# Patient Record
Sex: FEMALE | HISPANIC OR LATINO | Employment: FULL TIME | URBAN - METROPOLITAN AREA
[De-identification: names, ages, dates, MRNs, and addresses within clinical notes are randomized per-mention and may not be internally consistent; named-entity substitution may affect disease eponyms.]

---

## 2017-11-16 ENCOUNTER — APPOINTMENT (OUTPATIENT)
Dept: RADIOLOGY | Facility: IMAGING CENTER | Age: 27
End: 2017-11-16
Attending: NURSE PRACTITIONER
Payer: COMMERCIAL

## 2017-11-16 ENCOUNTER — OFFICE VISIT (OUTPATIENT)
Dept: URGENT CARE | Facility: PHYSICIAN GROUP | Age: 27
End: 2017-11-16
Payer: COMMERCIAL

## 2017-11-16 VITALS
OXYGEN SATURATION: 96 % | SYSTOLIC BLOOD PRESSURE: 126 MMHG | WEIGHT: 228 LBS | RESPIRATION RATE: 16 BRPM | HEART RATE: 110 BPM | TEMPERATURE: 99.8 F | HEIGHT: 66 IN | DIASTOLIC BLOOD PRESSURE: 80 MMHG | BODY MASS INDEX: 36.64 KG/M2

## 2017-11-16 DIAGNOSIS — M25.532 LEFT WRIST PAIN: ICD-10-CM

## 2017-11-16 DIAGNOSIS — J10.1 INFLUENZA A: Primary | ICD-10-CM

## 2017-11-16 LAB
FLUAV+FLUBV AG SPEC QL IA: POSITIVE
INT CON NEG: NEGATIVE
INT CON NEG: NEGATIVE
INT CON POS: POSITIVE
INT CON POS: POSITIVE
S PYO AG THROAT QL: NEGATIVE

## 2017-11-16 PROCEDURE — 87804 INFLUENZA ASSAY W/OPTIC: CPT | Performed by: NURSE PRACTITIONER

## 2017-11-16 PROCEDURE — 87880 STREP A ASSAY W/OPTIC: CPT | Performed by: NURSE PRACTITIONER

## 2017-11-16 PROCEDURE — 73100 X-RAY EXAM OF WRIST: CPT | Mod: TC,LT | Performed by: NURSE PRACTITIONER

## 2017-11-16 PROCEDURE — 99203 OFFICE O/P NEW LOW 30 MIN: CPT | Performed by: NURSE PRACTITIONER

## 2017-11-16 RX ORDER — OSELTAMIVIR PHOSPHATE 75 MG/1
75 CAPSULE ORAL 2 TIMES DAILY
Qty: 10 CAP | Refills: 0 | Status: SHIPPED | OUTPATIENT
Start: 2017-11-16 | End: 2021-01-26

## 2017-11-16 ASSESSMENT — ENCOUNTER SYMPTOMS
COUGH: 1
ABDOMINAL PAIN: 0
DIARRHEA: 0
SPUTUM PRODUCTION: 1
FEVER: 0
NAUSEA: 0
HEADACHES: 1
NECK PAIN: 0
SORE THROAT: 1
CHILLS: 1
VOMITING: 0
SENSORY CHANGE: 0
RHINORRHEA: 1
FOCAL WEAKNESS: 0
MYALGIAS: 1
TINGLING: 0
SHORTNESS OF BREATH: 0
BACK PAIN: 0
WEAKNESS: 1

## 2017-11-16 ASSESSMENT — COPD QUESTIONNAIRES: COPD: 0

## 2017-11-16 NOTE — PROGRESS NOTES
"Subjective:      Nicole Castro is a 27 y.o. female who presents with Cough (fever, sore throat, bilateral red spots on hands itchingX 3 days L. wrist pain X 1 wek)            Medications, Allergies and Prior Medical Hx reviewed and updated in [facility].with patient/family today     Bib with family who has similar sx. Pt states sx have been going thru the family.     Pt is also c/o left wrist pain unknown HIRO.       Cough   This is a new problem. The current episode started in the past 7 days (2 days). The problem has been gradually worsening. The cough is productive of sputum. Associated symptoms include chills, headaches, myalgias, nasal congestion, rhinorrhea and a sore throat. Pertinent negatives include no ear congestion, ear pain, fever or shortness of breath. Nothing aggravates the symptoms. She has tried OTC cough suppressant for the symptoms. The treatment provided mild relief. There is no history of asthma, COPD or emphysema.       Review of Systems   Constitutional: Positive for chills and malaise/fatigue. Negative for fever.   HENT: Positive for congestion, rhinorrhea and sore throat. Negative for ear discharge and ear pain.    Respiratory: Positive for cough and sputum production. Negative for shortness of breath.    Gastrointestinal: Negative for abdominal pain, diarrhea, nausea and vomiting.   Musculoskeletal: Positive for joint pain and myalgias. Negative for back pain and neck pain.   Neurological: Positive for weakness and headaches. Negative for tingling, sensory change and focal weakness.          Objective:     /80   Pulse (!) 110   Temp 37.7 °C (99.8 °F)   Resp 16   Ht 1.676 m (5' 6\")   Wt 103.4 kg (228 lb)   SpO2 96%   BMI 36.80 kg/m²      Physical Exam   Constitutional: She appears well-developed and well-nourished. No distress.   HENT:   Head: Normocephalic and atraumatic.   Right Ear: Tympanic membrane and ear canal normal.   Left Ear: Tympanic membrane and ear canal normal. "   Nose: Rhinorrhea present.   Mouth/Throat: Uvula is midline and mucous membranes are normal. No trismus in the jaw. No uvula swelling. Posterior oropharyngeal edema and posterior oropharyngeal erythema present. No oropharyngeal exudate.   Eyes: Conjunctivae are normal. Pupils are equal, round, and reactive to light.   Neck: Neck supple.   Cardiovascular: Normal rate, regular rhythm and normal heart sounds.    Pulmonary/Chest: Effort normal and breath sounds normal. No respiratory distress. She has no wheezes.   Musculoskeletal:        Left wrist: She exhibits decreased range of motion, tenderness and bony tenderness. She exhibits no swelling, no effusion, no crepitus, no deformity and no laceration.        Arms:  Lymphadenopathy:     She has cervical adenopathy.   Neurological: She is alert.   Skin: Skin is warm and dry. Capillary refill takes less than 2 seconds.   Psychiatric: She has a normal mood and affect. Her behavior is normal.   Vitals reviewed.              Assessment/Plan:     1. Upper respiratory tract infection, unspecified type  POCT Influenza A/B    POCT Rapid Strep A   2. Left wrist pain  DX-WRIST-LIMITED 2- LEFT       poct flu - positive A   poct strep - neg    Xray of left wrist -  Reviewed film and radiology interpretation:   No evidence of acute fracture or dislocation.    Modified Epic generated written imformation provided along with verbal instructions    Letter written for 3-4 days off of school/work    Rest, Fluids, tylenol, ibuprofen,  humidified air, and otc decongestants  Pt will go to the ER for worsening or changing symptoms as discussed,   Follow-up with your primary care provider or return here if not improving in 5 - 7 days   Discharge instructions discussed with pt/family who verbalize understanding and agreement with poc

## 2017-11-16 NOTE — LETTER
November 16, 2017         Patient: Nicole Castro   YOB: 1990   Date of Visit: 11/16/2017           To Whom it May Concern:    Nicole Castro was seen in my clinic on 11/16/2017. She should be off work for 3-4 days due to illness.     If you have any questions or concerns, please don't hesitate to call.        Sincerely,           JAY Flynn.  Electronically Signed

## 2018-02-01 ENCOUNTER — OFFICE VISIT (OUTPATIENT)
Dept: URGENT CARE | Facility: PHYSICIAN GROUP | Age: 28
End: 2018-02-01
Payer: COMMERCIAL

## 2018-02-01 VITALS
TEMPERATURE: 99.9 F | OXYGEN SATURATION: 97 % | DIASTOLIC BLOOD PRESSURE: 84 MMHG | HEART RATE: 85 BPM | WEIGHT: 232 LBS | BODY MASS INDEX: 37.45 KG/M2 | SYSTOLIC BLOOD PRESSURE: 114 MMHG | RESPIRATION RATE: 14 BRPM

## 2018-02-01 DIAGNOSIS — R50.9 FEVER, UNSPECIFIED FEVER CAUSE: ICD-10-CM

## 2018-02-01 DIAGNOSIS — J03.90 TONSILLITIS WITH EXUDATE: Primary | ICD-10-CM

## 2018-02-01 LAB
INT CON NEG: NORMAL
INT CON POS: NORMAL
S PYO AG THROAT QL: NEGATIVE

## 2018-02-01 PROCEDURE — 99214 OFFICE O/P EST MOD 30 MIN: CPT | Performed by: PHYSICIAN ASSISTANT

## 2018-02-01 PROCEDURE — 87880 STREP A ASSAY W/OPTIC: CPT | Performed by: PHYSICIAN ASSISTANT

## 2018-02-01 RX ORDER — IBUPROFEN 200 MG
600 TABLET ORAL ONCE
Status: COMPLETED | OUTPATIENT
Start: 2018-02-01 | End: 2018-02-01

## 2018-02-01 RX ORDER — AMOXICILLIN 875 MG/1
875 TABLET, COATED ORAL 2 TIMES DAILY
Qty: 20 TAB | Refills: 0 | Status: SHIPPED | OUTPATIENT
Start: 2018-02-01 | End: 2019-04-23

## 2018-02-01 RX ADMIN — Medication 600 MG: at 17:23

## 2018-02-03 ASSESSMENT — ENCOUNTER SYMPTOMS
SWOLLEN GLANDS: 1
SORE THROAT: 1
COUGH: 0
FEVER: 1

## 2018-02-03 NOTE — PROGRESS NOTES
Subjective:      Nicole Castro is a 27 y.o. female who presents with Pharyngitis (fever, red eyes, x2 days)    Pt PMH, SocHx, SurgHx, FamHx, Drug allergies and medications reviewed with pt/EPIC.      Family history reviewed, it is not pertinent to this complaint.           Pharyngitis    This is a new problem. The current episode started yesterday. The problem has been gradually worsening. Neither side of throat is experiencing more pain than the other. The maximum temperature recorded prior to her arrival was 100.4 - 100.9 F. The pain is at a severity of 8/10. Associated symptoms include a plugged ear sensation and swollen glands. Pertinent negatives include no congestion or coughing. She has had exposure to strep. She has tried NSAIDs and cool liquids for the symptoms. The treatment provided no relief.       Review of Systems   Constitutional: Positive for fever.   HENT: Positive for sore throat. Negative for congestion.    Respiratory: Negative for cough.    All other systems reviewed and are negative.         Objective:     /84   Pulse 85   Temp 37.7 °C (99.9 °F)   Resp 14   Wt 105.2 kg (232 lb)   SpO2 97%   BMI 37.45 kg/m²      Physical Exam   Constitutional: She is oriented to person, place, and time. She appears well-developed and well-nourished. No distress.   HENT:   Head: Normocephalic and atraumatic.   Right Ear: Tympanic membrane normal.   Left Ear: Tympanic membrane normal.   Nose: Nose normal.   Mouth/Throat: Uvula is midline. Posterior oropharyngeal erythema present. Tonsils are 3+ on the right. Tonsils are 3+ on the left. Tonsillar exudate.   Eyes: Conjunctivae and EOM are normal. Pupils are equal, round, and reactive to light.   Neck: Normal range of motion. Neck supple.   Cardiovascular: Normal rate, regular rhythm and normal heart sounds.    Pulmonary/Chest: Effort normal and breath sounds normal.   Abdominal: Soft.   Musculoskeletal: Normal range of motion.   Lymphadenopathy:      She has no cervical adenopathy.   Neurological: She is alert and oriented to person, place, and time. Gait normal.   Skin: Skin is warm and dry. Capillary refill takes less than 2 seconds.   Psychiatric: She has a normal mood and affect.   Nursing note and vitals reviewed.              Assessment/Plan:     1. Tonsillitis with exudate  amoxicillin (AMOXIL) 875 MG tablet    ibuprofen (MOTRIN) tablet 600 mg    POCT Rapid Strep A   2. Fever, unspecified fever cause  amoxicillin (AMOXIL) 875 MG tablet    ibuprofen (MOTRIN) tablet 600 mg    POCT Rapid Strep A       Motrin/Advil/Ibuprophen 600 mg every 6 hours as needed for pain or fever.    PT should follow up with PCP in 1-2 days for re-evaluation if symptoms have not improved.  Discussed red flags and reasons to return to UC or ED.  Pt and/or family verbalized understanding of diagnosis and follow up instructions and was offered informational handout on diagnosis.  PT discharged.

## 2019-04-23 ENCOUNTER — OFFICE VISIT (OUTPATIENT)
Dept: URGENT CARE | Facility: PHYSICIAN GROUP | Age: 29
End: 2019-04-23
Payer: COMMERCIAL

## 2019-04-23 VITALS
HEART RATE: 98 BPM | WEIGHT: 273 LBS | OXYGEN SATURATION: 95 % | DIASTOLIC BLOOD PRESSURE: 72 MMHG | TEMPERATURE: 97.8 F | HEIGHT: 66 IN | BODY MASS INDEX: 43.87 KG/M2 | SYSTOLIC BLOOD PRESSURE: 110 MMHG

## 2019-04-23 DIAGNOSIS — J06.9 UPPER RESPIRATORY TRACT INFECTION, UNSPECIFIED TYPE: ICD-10-CM

## 2019-04-23 LAB
INT CON NEG: NORMAL
INT CON POS: NORMAL
S PYO AG THROAT QL: NORMAL

## 2019-04-23 PROCEDURE — 87880 STREP A ASSAY W/OPTIC: CPT | Performed by: PHYSICIAN ASSISTANT

## 2019-04-23 PROCEDURE — 99214 OFFICE O/P EST MOD 30 MIN: CPT | Performed by: PHYSICIAN ASSISTANT

## 2019-04-23 RX ORDER — AZITHROMYCIN 250 MG/1
TABLET, FILM COATED ORAL
Qty: 6 TAB | Refills: 0 | Status: SHIPPED | OUTPATIENT
Start: 2019-04-23 | End: 2021-01-26

## 2019-04-23 ASSESSMENT — ENCOUNTER SYMPTOMS
COUGH: 1
SORE THROAT: 1
SPUTUM PRODUCTION: 1

## 2019-04-24 NOTE — PROGRESS NOTES
Subjective:   Nicole Castro is a 29 y.o. female who presents today with   Chief Complaint   Patient presents with   • Sinus Problem     x4days nasal drainage, productive cough, swollen tonsils       Sinus Problem   This is a new problem. Episode onset: 3 days. The problem is unchanged. There has been no fever. The pain is mild. Associated symptoms include congestion, coughing and a sore throat. Pertinent negatives include no ear pain.   Patient has seasonal allergies I have turned into a congested cough. She has been taking allergy medications with no relief.  She also complains of sore throat along with congested cough.    PMH:  has no past medical history of Allergy; ASTHMA; or Diabetes.  MEDS:   Current Outpatient Prescriptions:   •  Pseudoephedrine-APAP-DM (DAYQUIL PO), Take  by mouth., Disp: , Rfl:   •  azithromycin (ZITHROMAX) 250 MG Tab, Take 2 tablets by mouth on day one. Take one tablet by mouth the remaining days until gone, Disp: 6 Tab, Rfl: 0  •  Pseudoeph-Doxylamine-DM-APAP (DAYQUIL/NYQUIL COLD/FLU RELIEF PO), Take  by mouth., Disp: , Rfl:   •  oseltamivir (TAMIFLU) 75 MG Cap, Take 1 Cap by mouth 2 times a day. (Patient not taking: Reported on 4/23/2019), Disp: 10 Cap, Rfl: 0  •  benzonatate (TESSALON) 100 MG CAPS, Take 2 Caps by mouth 3 times a day as needed for Cough. (Patient not taking: Reported on 4/23/2019), Disp: 30 Cap, Rfl: 0  •  guaifenesin-codeine (ROBITUSSIN AC) SOLN, Take 5 mL by mouth every four hours as needed for Cough. (Patient not taking: Reported on 4/23/2019), Disp: 120 mL, Rfl: 0  ALLERGIES: No Known Allergies  SURGHX: History reviewed. No pertinent surgical history.  SOCHX:  reports that she has never smoked. She has never used smokeless tobacco. She reports that she uses drugs, including Marijuana. She reports that she does not drink alcohol.  FH: Reviewed with patient, not pertinent to this visit.       Review of Systems   HENT: Positive for congestion and sore throat.  "Negative for ear discharge and ear pain.    Respiratory: Positive for cough and sputum production.    All other systems reviewed and are negative.       Objective:   /72   Pulse 98   Temp 36.6 °C (97.8 °F) (Temporal)   Ht 1.676 m (5' 6\")   Wt 123.8 kg (273 lb)   SpO2 95%   BMI 44.06 kg/m²   Physical Exam   Constitutional: She appears well-developed and well-nourished. No distress.   HENT:   Head: Normocephalic and atraumatic.   Right Ear: Hearing, tympanic membrane and ear canal normal.   Left Ear: Hearing, tympanic membrane and ear canal normal.   Mouth/Throat: Posterior oropharyngeal edema and posterior oropharyngeal erythema present. Tonsils are 3+ on the right. Tonsils are 3+ on the left. No tonsillar exudate.   Eyes: Pupils are equal, round, and reactive to light.   Cardiovascular: Normal rate, regular rhythm and normal heart sounds.    Pulmonary/Chest: Effort normal and breath sounds normal.   Musculoskeletal:   Normal movement in all 4 extremities   Neurological: She is alert. Coordination normal.   Skin: Skin is warm and dry.   Psychiatric: She has a normal mood and affect.   Nursing note and vitals reviewed.  STREP NEG  Congested cough appreciated upon exam.    Assessment/Plan:   Assessment    1. Upper respiratory tract infection, unspecified type  - POCT Rapid Strep A  - azithromycin (ZITHROMAX) 250 MG Tab; Take 2 tablets by mouth on day one. Take one tablet by mouth the remaining days until gone  Dispense: 6 Tab; Refill: 0    Other orders  - Pseudoephedrine-APAP-DM (DAYQUIL PO); Take  by mouth.  Encourage patient to continue taking over-the-counter allergy medication.  Also encouraged her to try Flonase or over-the-counter decongestant to help relieve her congestion.  Differential diagnosis, natural history, supportive care, and indications for immediate follow-up discussed.   Patient given instructions and understanding of medications and treatment.    If not improving in 3-5 days, F/U with " PCP or return to UC if symptoms worsen.    Patient agreeable to plan.      Herson Khoury PA-C

## 2019-07-14 ENCOUNTER — OFFICE VISIT (OUTPATIENT)
Dept: URGENT CARE | Facility: PHYSICIAN GROUP | Age: 29
End: 2019-07-14
Payer: COMMERCIAL

## 2019-07-14 ENCOUNTER — HOSPITAL ENCOUNTER (OUTPATIENT)
Dept: RADIOLOGY | Facility: MEDICAL CENTER | Age: 29
End: 2019-07-14
Attending: NURSE PRACTITIONER
Payer: COMMERCIAL

## 2019-07-14 VITALS
WEIGHT: 273 LBS | HEART RATE: 83 BPM | DIASTOLIC BLOOD PRESSURE: 68 MMHG | OXYGEN SATURATION: 96 % | TEMPERATURE: 97.9 F | BODY MASS INDEX: 45.48 KG/M2 | SYSTOLIC BLOOD PRESSURE: 110 MMHG | HEIGHT: 65 IN

## 2019-07-14 DIAGNOSIS — S89.91XA SOFT TISSUE INJURY OF RIGHT LOWER LEG, INITIAL ENCOUNTER: ICD-10-CM

## 2019-07-14 DIAGNOSIS — M79.604 PAIN OF RIGHT LOWER EXTREMITY DUE TO INJURY: ICD-10-CM

## 2019-07-14 PROCEDURE — 73590 X-RAY EXAM OF LOWER LEG: CPT | Mod: RT

## 2019-07-14 PROCEDURE — 99213 OFFICE O/P EST LOW 20 MIN: CPT | Performed by: NURSE PRACTITIONER

## 2019-07-14 NOTE — LETTER
July 14, 2019       Patient: Nicole Castro   YOB: 1990   Date of Visit: 7/14/2019         To Whom It May Concern:    It is my medical opinion that Nicole Castro be allowed to avoid heavy lift/carry > 10 pounds until 7/15/19.    If you have any questions or concerns, please don't hesitate to call [phone]          Sincerely,          BILLIE Galo.N.P.  Electronically Signed

## 2019-07-15 ASSESSMENT — ENCOUNTER SYMPTOMS
WEAKNESS: 0
BRUISES/BLEEDS EASILY: 0
MYALGIAS: 1
FEVER: 0
FALLS: 0
CHILLS: 0
SENSORY CHANGE: 0
TINGLING: 0

## 2019-10-31 ENCOUNTER — OFFICE VISIT (OUTPATIENT)
Dept: URGENT CARE | Facility: PHYSICIAN GROUP | Age: 29
End: 2019-10-31
Payer: COMMERCIAL

## 2019-10-31 VITALS
WEIGHT: 272.8 LBS | HEART RATE: 67 BPM | BODY MASS INDEX: 45.45 KG/M2 | SYSTOLIC BLOOD PRESSURE: 118 MMHG | RESPIRATION RATE: 20 BRPM | OXYGEN SATURATION: 98 % | HEIGHT: 65 IN | DIASTOLIC BLOOD PRESSURE: 70 MMHG | TEMPERATURE: 97.6 F

## 2019-10-31 DIAGNOSIS — R51.9 NONINTRACTABLE HEADACHE, UNSPECIFIED CHRONICITY PATTERN, UNSPECIFIED HEADACHE TYPE: ICD-10-CM

## 2019-10-31 DIAGNOSIS — J35.1 TONSILLAR HYPERTROPHY: ICD-10-CM

## 2019-10-31 DIAGNOSIS — J02.9 PHARYNGITIS, UNSPECIFIED ETIOLOGY: ICD-10-CM

## 2019-10-31 PROCEDURE — 99214 OFFICE O/P EST MOD 30 MIN: CPT | Performed by: PHYSICIAN ASSISTANT

## 2019-10-31 RX ORDER — KETOROLAC TROMETHAMINE 30 MG/ML
60 INJECTION, SOLUTION INTRAMUSCULAR; INTRAVENOUS ONCE
Status: COMPLETED | OUTPATIENT
Start: 2019-10-31 | End: 2019-10-31

## 2019-10-31 RX ORDER — PROMETHAZINE HYDROCHLORIDE 25 MG/1
25 TABLET ORAL EVERY 6 HOURS PRN
Qty: 30 TAB | Refills: 0 | Status: SHIPPED | OUTPATIENT
Start: 2019-10-31 | End: 2021-01-26

## 2019-10-31 RX ORDER — BUTALBITAL, ACETAMINOPHEN AND CAFFEINE 50; 325; 40 MG/1; MG/1; MG/1
1 TABLET ORAL EVERY 4 HOURS PRN
Qty: 20 TAB | Refills: 0 | Status: SHIPPED | OUTPATIENT
Start: 2019-10-31 | End: 2019-11-07

## 2019-10-31 RX ADMIN — KETOROLAC TROMETHAMINE 60 MG: 30 INJECTION, SOLUTION INTRAMUSCULAR; INTRAVENOUS at 12:54

## 2019-10-31 ASSESSMENT — ENCOUNTER SYMPTOMS
NAUSEA: 0
CHILLS: 0
COUGH: 0
WHEEZING: 0
HEADACHES: 1
ABDOMINAL PAIN: 0
DIARRHEA: 0
SHORTNESS OF BREATH: 0
VOMITING: 0
FEVER: 0
SPUTUM PRODUCTION: 0
SORE THROAT: 1

## 2019-10-31 NOTE — PROGRESS NOTES
"Subjective:   Nicole Castro  is a 29 y.o. female who presents for Headache (body feels heavy, sore throat, fatigue, onset yesterday at 10am )        Patient comes clinic complaining of headache.  She notes headache has been bilateral temples as well as occipital head.  She states is similar but not exactly the same as her prior migraines.  She denies aura or blurry vision that she has with her migraines but similar location in pain.  She denies fevers chills.  She notes mild sore throat with sinus congestion.  Denies ear pain.  Notes sensitivity to light and sound.  Complains of mild nausea with this.  Denies vomiting.  Denies fevers chills or cough.  Denies abdominal pain diarrhea.  Denies urinary symptoms.  Complains of fatigue associated with headache.    Headache    Associated symptoms include a sore throat. Pertinent negatives include no abdominal pain, coughing, fever, nausea or vomiting.     Review of Systems   Constitutional: Negative for chills and fever.   HENT: Positive for sore throat.    Respiratory: Negative for cough, sputum production, shortness of breath and wheezing.    Gastrointestinal: Negative for abdominal pain, diarrhea, nausea and vomiting.   Skin: Negative for rash.   Neurological: Positive for headaches.     No Known Allergies   Objective:   /70 (BP Location: Right arm, Patient Position: Sitting, BP Cuff Size: Adult)   Pulse 67   Temp 36.4 °C (97.6 °F) (Temporal)   Resp 20   Ht 1.651 m (5' 5\")   Wt 123.7 kg (272 lb 12.8 oz)   SpO2 98%   BMI 45.40 kg/m²   Physical Exam   Constitutional: She is oriented to person, place, and time. She appears well-developed and well-nourished. No distress.   HENT:   Head: Normocephalic and atraumatic.   Right Ear: Tympanic membrane, external ear and ear canal normal.   Left Ear: Tympanic membrane, external ear and ear canal normal.   Nose: Nose normal.   Mouth/Throat: Uvula is midline and mucous membranes are normal. Posterior " oropharyngeal erythema ( mild PND) present. No oropharyngeal exudate, posterior oropharyngeal edema or tonsillar abscesses.   Eyes: Pupils are equal, round, and reactive to light. Conjunctivae, EOM and lids are normal. Right eye exhibits no discharge and no exudate. Left eye exhibits no discharge and no exudate. Right conjunctiva is not injected. Right conjunctiva has no hemorrhage. Left conjunctiva is not injected. Left conjunctiva has no hemorrhage. No scleral icterus.   Neck: Neck supple.   Pulmonary/Chest: Effort normal. No respiratory distress. She has no decreased breath sounds. She has no wheezes. She has no rhonchi. She has no rales.   Musculoskeletal: Normal range of motion.   Lymphadenopathy:     She has cervical adenopathy ( mild bilat).   Neurological: She is alert and oriented to person, place, and time. She has normal strength. She is not disoriented. No cranial nerve deficit or sensory deficit. Coordination and gait normal. GCS eye subscore is 4. GCS verbal subscore is 5. GCS motor subscore is 6.   Skin: Skin is warm and dry. She is not diaphoretic. No erythema. No pallor.   Psychiatric: Her speech is normal and behavior is normal.   Nursing note and vitals reviewed.  Toradol 60 IM-tolerates well      Assessment/Plan:   1. Pharyngitis, unspecified etiology    2. Nonintractable headache, unspecified chronicity pattern, unspecified headache type  - ketorolac (TORADOL) injection 60 mg  - acetaminophen/caffeine/butalbital 325-40-50 mg (FIORICET) -40 MG Tab; Take 1 Tab by mouth every four hours as needed for Headache for up to 7 days.  Dispense: 20 Tab; Refill: 0  - promethazine (PHENERGAN) 25 MG Tab; Take 1 Tab by mouth every 6 hours as needed (headache).  Dispense: 30 Tab; Refill: 0    3. Tonsillar hypertrophy  - REFERRAL TO ENT  Supportive care is reviewed with patient/caregiver - recommend to push PO fluids and electrolytes, discussed medicines to help with headache over-the-counter as well as  red flag symptoms to return to clinic, Cautioned regarding potential for sedation with medication.  Sent w/ work note  Return to clinic with lack of resolution or progression of symptoms.  ER precautions with any worsening symptoms are reviewed with patient/caregiver and they do express understanding    Patient additionally requests referral to ENT due to the long time tonsillar hypertrophy.      Differential diagnosis, natural history, supportive care, and indications for immediate follow-up discussed.

## 2019-10-31 NOTE — LETTER
October 31, 2019       Patient: Nicole Castro   YOB: 1990   Date of Visit: 10/31/2019         To Whom It May Concern:    It is my medical opinion that Nicole Castro should be excused from work for yesterday, today and tomorrow due to illness.        If you have any questions or concerns, please don't hesitate to call [phone]          Sincerely,          Tc Mariee P.A.-C.  Electronically Signed

## 2019-11-05 ENCOUNTER — OFFICE VISIT (OUTPATIENT)
Dept: URGENT CARE | Facility: PHYSICIAN GROUP | Age: 29
End: 2019-11-05
Payer: COMMERCIAL

## 2019-11-05 VITALS
RESPIRATION RATE: 20 BRPM | BODY MASS INDEX: 44.98 KG/M2 | HEIGHT: 65 IN | TEMPERATURE: 101.3 F | SYSTOLIC BLOOD PRESSURE: 128 MMHG | HEART RATE: 120 BPM | OXYGEN SATURATION: 99 % | WEIGHT: 270 LBS | DIASTOLIC BLOOD PRESSURE: 86 MMHG

## 2019-11-05 DIAGNOSIS — J03.90 EXUDATIVE TONSILLITIS: ICD-10-CM

## 2019-11-05 LAB
HETEROPH AB SER QL LA: NEGATIVE
INT CON NEG: NORMAL
INT CON NEG: NORMAL
INT CON POS: NORMAL
INT CON POS: NORMAL
S PYO AG THROAT QL: NEGATIVE

## 2019-11-05 PROCEDURE — 86308 HETEROPHILE ANTIBODY SCREEN: CPT | Performed by: PHYSICIAN ASSISTANT

## 2019-11-05 PROCEDURE — 99214 OFFICE O/P EST MOD 30 MIN: CPT | Performed by: PHYSICIAN ASSISTANT

## 2019-11-05 PROCEDURE — 87880 STREP A ASSAY W/OPTIC: CPT | Performed by: PHYSICIAN ASSISTANT

## 2019-11-05 RX ORDER — DEXAMETHASONE SODIUM PHOSPHATE 4 MG/ML
16 INJECTION, SOLUTION INTRA-ARTICULAR; INTRALESIONAL; INTRAMUSCULAR; INTRAVENOUS; SOFT TISSUE ONCE
Status: COMPLETED | OUTPATIENT
Start: 2019-11-05 | End: 2019-11-05

## 2019-11-05 RX ORDER — PREDNISONE 5 MG/ML
50 SOLUTION ORAL DAILY
Qty: 250 ML | Refills: 0 | Status: SHIPPED | OUTPATIENT
Start: 2019-11-05 | End: 2019-11-10

## 2019-11-05 RX ORDER — AMOXICILLIN 400 MG/5ML
875 POWDER, FOR SUSPENSION ORAL 2 TIMES DAILY
Qty: 218 ML | Refills: 0 | Status: SHIPPED | OUTPATIENT
Start: 2019-11-05 | End: 2019-11-15

## 2019-11-05 RX ADMIN — DEXAMETHASONE SODIUM PHOSPHATE 16 MG: 4 INJECTION, SOLUTION INTRA-ARTICULAR; INTRALESIONAL; INTRAMUSCULAR; INTRAVENOUS; SOFT TISSUE at 13:24

## 2019-11-05 ASSESSMENT — ENCOUNTER SYMPTOMS
ABDOMINAL PAIN: 0
EYE PAIN: 0
SORE THROAT: 1
CHILLS: 1
DIARRHEA: 0
HEADACHES: 1
NAUSEA: 0
COUGH: 0
SHORTNESS OF BREATH: 0
DIZZINESS: 0
SINUS PAIN: 0
FEVER: 1
MYALGIAS: 1
BLURRED VISION: 0
VOMITING: 0
TROUBLE SWALLOWING: 1
PALPITATIONS: 0
SWOLLEN GLANDS: 1

## 2019-11-05 NOTE — PROGRESS NOTES
Subjective:      Nicole Castro is a 29 y.o. female who presents with Fever (x1days sore throat, congestion, bodyaches)      Pharyngitis    This is a new problem. The current episode started yesterday. The problem has been gradually worsening. Neither side of throat is experiencing more pain than the other. The maximum temperature recorded prior to her arrival was 101 - 101.9 F. The fever has been present for less than 1 day. The pain is moderate. Associated symptoms include congestion, ear pain, headaches, a plugged ear sensation, swollen glands and trouble swallowing. Pertinent negatives include no abdominal pain, coughing, diarrhea, shortness of breath or vomiting. She has had no exposure to strep or mono. Treatments tried: Tea with honey. The treatment provided no relief.       Review of Systems   Constitutional: Positive for chills, fever and malaise/fatigue.   HENT: Positive for congestion, ear pain, sore throat and trouble swallowing. Negative for sinus pain.    Eyes: Negative for blurred vision and pain.   Respiratory: Negative for cough and shortness of breath.    Cardiovascular: Negative for chest pain and palpitations.   Gastrointestinal: Negative for abdominal pain, diarrhea, nausea and vomiting.   Musculoskeletal: Positive for myalgias.   Skin: Negative for rash.   Neurological: Positive for headaches. Negative for dizziness.       PMH:  has no past medical history of Allergy, ASTHMA, or Diabetes.  MEDS:   Current Outpatient Medications:   •  predniSONE (LIQUI PRED) 5 MG/5ML Solution, Take 50 mL by mouth every day for 5 days., Disp: 250 mL, Rfl: 0  •  amoxicillin (AMOXIL) 400 MG/5ML suspension, Take 10.9 mL by mouth 2 times a day for 10 days., Disp: 218 mL, Rfl: 0  •  acetaminophen/caffeine/butalbital 325-40-50 mg (FIORICET) -40 MG Tab, Take 1 Tab by mouth every four hours as needed for Headache for up to 7 days. (Patient not taking: Reported on 11/5/2019), Disp: 20 Tab, Rfl: 0  •   "promethazine (PHENERGAN) 25 MG Tab, Take 1 Tab by mouth every 6 hours as needed (headache). (Patient not taking: Reported on 11/5/2019), Disp: 30 Tab, Rfl: 0  •  Pseudoephedrine-APAP-DM (DAYQUIL PO), Take  by mouth., Disp: , Rfl:   •  azithromycin (ZITHROMAX) 250 MG Tab, Take 2 tablets by mouth on day one. Take one tablet by mouth the remaining days until gone (Patient not taking: Reported on 11/5/2019), Disp: 6 Tab, Rfl: 0  •  Pseudoeph-Doxylamine-DM-APAP (DAYQUIL/NYQUIL COLD/FLU RELIEF PO), Take  by mouth., Disp: , Rfl:   •  oseltamivir (TAMIFLU) 75 MG Cap, Take 1 Cap by mouth 2 times a day. (Patient not taking: Reported on 4/23/2019), Disp: 10 Cap, Rfl: 0  •  benzonatate (TESSALON) 100 MG CAPS, Take 2 Caps by mouth 3 times a day as needed for Cough. (Patient not taking: Reported on 4/23/2019), Disp: 30 Cap, Rfl: 0  •  guaifenesin-codeine (ROBITUSSIN AC) SOLN, Take 5 mL by mouth every four hours as needed for Cough. (Patient not taking: Reported on 4/23/2019), Disp: 120 mL, Rfl: 0  ALLERGIES: No Known Allergies  SURGHX: History reviewed. No pertinent surgical history.  SOCHX:  reports that she has never smoked. She has never used smokeless tobacco. She reports current alcohol use. She reports current drug use. Drug: Marijuana.  FH: Family history was reviewed, no pertinent findings to report     Objective:     /86   Pulse (!) 120   Temp (!) 38.5 °C (101.3 °F) (Temporal)   Resp 20   Ht 1.651 m (5' 5\")   Wt 122.5 kg (270 lb)   SpO2 99%   BMI 44.93 kg/m²      Physical Exam  Vitals signs reviewed.   Constitutional:       Appearance: She is well-developed.   HENT:      Head: Normocephalic and atraumatic.      Right Ear: Tympanic membrane, ear canal and external ear normal.      Left Ear: Tympanic membrane, ear canal and external ear normal.      Nose: Nose normal.      Mouth/Throat:      Pharynx: Posterior oropharyngeal erythema present. No uvula swelling.      Tonsils: Tonsillar exudate present. Swelling: " 3+ on the right. 3+ on the left.   Eyes:      Conjunctiva/sclera: Conjunctivae normal.      Pupils: Pupils are equal, round, and reactive to light.   Neck:      Musculoskeletal: Normal range of motion.   Cardiovascular:      Rate and Rhythm: Regular rhythm. Tachycardia present.      Heart sounds: Normal heart sounds. No murmur.   Pulmonary:      Effort: Pulmonary effort is normal.      Breath sounds: Normal breath sounds. No wheezing.   Lymphadenopathy:      Head:      Right side of head: Tonsillar adenopathy present.      Left side of head: Tonsillar adenopathy present.      Cervical: Cervical adenopathy present.      Right cervical: Superficial cervical adenopathy and posterior cervical adenopathy present.      Left cervical: Superficial cervical adenopathy and posterior cervical adenopathy present.   Skin:     General: Skin is warm and dry.      Capillary Refill: Capillary refill takes less than 2 seconds.   Neurological:      Mental Status: She is alert and oriented to person, place, and time.   Psychiatric:         Behavior: Behavior normal.         Judgment: Judgment normal.         POCT Rapid Strep A - Negative    POCT Mononucleosis (mono) - Negative     Assessment/Plan:     1. Exudative tonsillitis  - dexamethasone (DECADRON) injection 16 mg  - POCT Rapid Strep A  - POCT Mononucleosis (mono)  - predniSONE (LIQUI PRED) 5 MG/5ML Solution; Take 50 mL by mouth every day for 5 days.  Dispense: 250 mL; Refill: 0  - amoxicillin (AMOXIL) 400 MG/5ML suspension; Take 10.9 mL by mouth 2 times a day for 10 days.  Dispense: 218 mL; Refill: 0        Differential Diagnosis, natural history, and supportive care discussed. Return to the Urgent Care or follow up with your PCP if symptoms fail to resolve, or for any new or worsening symptoms. Emergency room precautions discussed. Patient and/or family appears understanding of information.

## 2019-11-05 NOTE — LETTER
November 5, 2019         Patient: Nicole Castro   YOB: 1990   Date of Visit: 11/5/2019           To Whom it May Concern:    Nicole Castro was seen in my clinic on 11/5/2019. She may return to work on 11/7/2019.    If you have any questions or concerns, please don't hesitate to call.        Sincerely,           Helena Vasquez P.A.-C.  Electronically Signed

## 2021-01-26 ENCOUNTER — OFFICE VISIT (OUTPATIENT)
Dept: URGENT CARE | Facility: PHYSICIAN GROUP | Age: 31
End: 2021-01-26
Payer: COMMERCIAL

## 2021-01-26 VITALS
HEART RATE: 88 BPM | HEIGHT: 65 IN | DIASTOLIC BLOOD PRESSURE: 80 MMHG | WEIGHT: 270 LBS | SYSTOLIC BLOOD PRESSURE: 152 MMHG | BODY MASS INDEX: 44.98 KG/M2 | OXYGEN SATURATION: 98 % | TEMPERATURE: 98.3 F | RESPIRATION RATE: 16 BRPM

## 2021-01-26 DIAGNOSIS — J03.91 RECURRENT TONSILLITIS: ICD-10-CM

## 2021-01-26 PROCEDURE — 99214 OFFICE O/P EST MOD 30 MIN: CPT | Performed by: FAMILY MEDICINE

## 2021-01-26 RX ORDER — LIDOCAINE HYDROCHLORIDE 20 MG/ML
15 SOLUTION OROPHARYNGEAL EVERY 4 HOURS PRN
Qty: 120 ML | Refills: 0 | Status: SHIPPED | OUTPATIENT
Start: 2021-01-26 | End: 2022-07-09

## 2021-01-26 RX ORDER — METHYLPREDNISOLONE 4 MG/1
TABLET ORAL
Qty: 21 TAB | Refills: 0 | Status: SHIPPED | OUTPATIENT
Start: 2021-01-26 | End: 2022-07-09

## 2021-01-26 RX ORDER — AMOXICILLIN 500 MG/1
500 CAPSULE ORAL 2 TIMES DAILY
Qty: 20 CAP | Refills: 0 | Status: SHIPPED | OUTPATIENT
Start: 2021-01-26 | End: 2021-02-05

## 2021-01-26 ASSESSMENT — ENCOUNTER SYMPTOMS
NAUSEA: 0
EYE REDNESS: 0
MYALGIAS: 0
WEIGHT LOSS: 0
VOMITING: 0
EYE DISCHARGE: 0

## 2021-01-26 NOTE — PROGRESS NOTES
"Subjective:      Nicole Castro is a 30 y.o. female who presents with Pharyngitis (x2 days pressure in ear, sore throat, post nasal drip, )            2 days sore throat, swollen tonsils. , left ear fullness, nasal congestion and drainage.  PMH recurrent tonsillitis and was recommended last year to have a tonsillectomy.  She is requesting a new ENT referral for this.  She also notes that she generally has a negative strep but responds well to antibiotic therapy.  Cough due to drainage. No SOB/wheeze. No fever. No rash.  Moderate severity. Tolerating fluids normal urine output.  No relief with OTC medication.  No other aggravating or alleviating factors.      Review of Systems   Constitutional: Negative for malaise/fatigue and weight loss.   Eyes: Negative for discharge and redness.   Gastrointestinal: Negative for nausea and vomiting.   Musculoskeletal: Negative for joint pain and myalgias.   Skin: Negative for itching and rash.     .  Medications, Allergies, and current problem list reviewed today in Epic       Objective:     /80 (BP Location: Left arm, Patient Position: Sitting, BP Cuff Size: Large adult)   Pulse 88   Temp 36.8 °C (98.3 °F)   Resp 16   Ht 1.651 m (5' 5\")   Wt 122.5 kg (270 lb)   SpO2 98%   BMI 44.93 kg/m²      Physical Exam  Constitutional:       General: She is not in acute distress.     Appearance: She is well-developed.   HENT:      Head: Normocephalic and atraumatic.      Right Ear: Tympanic membrane normal.      Mouth/Throat:      Comments: 3+ red tonsils without purulent exudate. No evidence of abscess.    Eyes:      Conjunctiva/sclera: Conjunctivae normal.   Neck:      Musculoskeletal: Neck supple.   Cardiovascular:      Rate and Rhythm: Normal rate and regular rhythm.      Heart sounds: Normal heart sounds. No murmur.   Pulmonary:      Effort: Pulmonary effort is normal.      Breath sounds: Normal breath sounds. No wheezing.   Lymphadenopathy:      Cervical: Cervical " adenopathy present.   Skin:     General: Skin is warm and dry.      Findings: No rash.   Neurological:      Mental Status: She is alert and oriented to person, place, and time.                 Assessment/Plan:         1. Recurrent tonsillitis  amoxicillin (AMOXIL) 500 MG Cap    REFERRAL TO ENT    methylPREDNISolone (MEDROL DOSEPAK) 4 MG Tablet Therapy Pack    lidocaine (XYLOCAINE) 2 % Solution     Differential diagnosis, natural history, supportive care, and indications for immediate follow-up discussed at length.

## 2021-01-26 NOTE — LETTER
January 26, 2021         Patient: Nicole Castro   YOB: 1990   Date of Visit: 1/26/2021           To Whom it May Concern:    Nicole Castro was seen in my clinic on 1/26/2021. Please excuse 1/25 and 1/26/2021.         Sincerely,           Denny Espinoza M.D.  Electronically Signed

## 2022-07-09 ENCOUNTER — OFFICE VISIT (OUTPATIENT)
Dept: URGENT CARE | Facility: PHYSICIAN GROUP | Age: 32
End: 2022-07-09
Payer: COMMERCIAL

## 2022-07-09 VITALS
WEIGHT: 286.8 LBS | HEIGHT: 65 IN | BODY MASS INDEX: 47.78 KG/M2 | SYSTOLIC BLOOD PRESSURE: 116 MMHG | RESPIRATION RATE: 18 BRPM | DIASTOLIC BLOOD PRESSURE: 78 MMHG | TEMPERATURE: 98.8 F | HEART RATE: 89 BPM | OXYGEN SATURATION: 98 %

## 2022-07-09 DIAGNOSIS — R05.9 COUGH: ICD-10-CM

## 2022-07-09 DIAGNOSIS — R50.81 FEVER IN OTHER DISEASES: ICD-10-CM

## 2022-07-09 DIAGNOSIS — U07.1 COVID-19: ICD-10-CM

## 2022-07-09 LAB
EXTERNAL QUALITY CONTROL: ABNORMAL
SARS-COV+SARS-COV-2 AG RESP QL IA.RAPID: POSITIVE

## 2022-07-09 PROCEDURE — 99213 OFFICE O/P EST LOW 20 MIN: CPT | Mod: CS

## 2022-07-09 PROCEDURE — 87426 SARSCOV CORONAVIRUS AG IA: CPT

## 2022-07-09 RX ORDER — ACETAMINOPHEN 325 MG/1
650 TABLET ORAL EVERY 4 HOURS PRN
COMMUNITY
End: 2023-01-05

## 2022-07-09 RX ORDER — BENZONATATE 100 MG/1
100 CAPSULE ORAL 2 TIMES DAILY PRN
Qty: 20 CAPSULE | Refills: 0 | Status: SHIPPED | OUTPATIENT
Start: 2022-07-09 | End: 2023-01-05

## 2022-07-09 ASSESSMENT — ENCOUNTER SYMPTOMS
DIARRHEA: 0
SPUTUM PRODUCTION: 1
VOMITING: 0
FEVER: 1
NAUSEA: 0
SORE THROAT: 1
COUGH: 1
MYALGIAS: 0
CHILLS: 1

## 2022-07-09 NOTE — PROGRESS NOTES
"Subjective     Nicole Castro is a 32 y.o. female who presents with Fever (X2days, cough,headaches, hallucinations, no appetite. Diarrhea x2days. ) and Sore Throat (Post nasal drip causing irritation. )          HPI     Patient presents with symptoms that started yesterday.  She endorses fever and chills, with temperature as high as 103.4°F, last fever this morning.  She further reports fatigue, nasal congestion, rhinorrhea, sore throat, productive cough.  She denies any nausea, vomiting, diarrhea, myalgias.  Patient reports has been taking Tylenol and OTC cough preparation, with some relief.  Patient reports she is COVID vaccinated.  She denies any known sick contacts.    Patient's current problem list, medications, and past medical/surgical history were reviewed in Epic.    PMH:  has no past medical history of Allergy, ASTHMA, or Diabetes.  MEDS:   Current Outpatient Medications:   •  acetaminophen (TYLENOL) 325 MG Tab, Take 650 mg by mouth every four hours as needed., Disp: , Rfl:   ALLERGIES: No Known Allergies  SURGHX: No past surgical history on file.  SOCHX:  reports that she has never smoked. She has never used smokeless tobacco. She reports current alcohol use. She reports current drug use. Drug: Marijuana.  FH: Reviewed with patient, not pertinent to this visit.       Review of Systems   Constitutional: Positive for chills, fever and malaise/fatigue.   HENT: Positive for congestion and sore throat.    Respiratory: Positive for cough and sputum production.    Gastrointestinal: Negative for diarrhea, nausea and vomiting.   Musculoskeletal: Negative for myalgias.              Objective     /78   Pulse 89   Temp 37.1 °C (98.8 °F) (Tympanic)   Resp 18   Ht 1.651 m (5' 5\")   Wt (!) 130 kg (286 lb 12.8 oz)   SpO2 98%   BMI 47.73 kg/m²      Physical Exam  Constitutional:       Appearance: Normal appearance.   HENT:      Head: Normocephalic.      Nose: Congestion present.      Mouth/Throat:      " Pharynx: Posterior oropharyngeal erythema present.   Eyes:      Extraocular Movements: Extraocular movements intact.   Cardiovascular:      Rate and Rhythm: Normal rate and regular rhythm.      Pulses: Normal pulses.      Heart sounds: Normal heart sounds.   Pulmonary:      Effort: Pulmonary effort is normal.      Breath sounds: Normal breath sounds.   Musculoskeletal:         General: Normal range of motion.      Cervical back: Normal range of motion.   Skin:     General: Skin is warm and dry.   Neurological:      General: No focal deficit present.      Mental Status: She is alert.   Psychiatric:         Mood and Affect: Mood normal.         Behavior: Behavior normal.         Judgment: Judgment normal.             Results:    Influenza A/B- negative  Rapid COVID test- positive         Assessment & Plan        1. COVID-19    - benzonatate (TESSALON) 100 MG Cap; Take 1 Capsule by mouth 2 times a day as needed for Cough.  Dispense: 20 Capsule; Refill: 0    2. Fever in other diseases    - POCT SARS-COV Antigen KATHRINE (Symptomatic only)  - POCT Influenza A/B    3. Cough    Patient tested positive for COVID 19.  She is instructed to quarantine per CDC guidelines.  May take Tessalon Perles twice daily as needed for cough.  Discussed treatment plan with patient, she is agreeable and verbalized understanding.  Educated patient on signs and symptoms watch out for, when to return to the clinic or go to the ER.      Recommended supportive treatment at home:  OTC Tylenol or Motrin for fever/discomfort.  OTC supportive care for nasal congestion - saline nasal spray/Flonase nasal spray and/or netipot  Humidifier and steam inhalation/warm showers.  Increase oral fluid intake.  Follow-up with PCP     Work note provided.    Electronically Signed by SUSAN Adamson

## 2022-07-09 NOTE — LETTER
"[facility] [address] [address]     July 9, 2022    Patient: Nicole Castro   YOB: 1990   Date of Visit: 7/9/2022       To Whom It May Concern:    Nicole Castro was seen and treated in our department on 7/9/2022.  Patient tested positive for COVID today, and instructed to quarantine per CDC guidelines.      May return to work on Thursday, 7/14/2022, if with no fever or diarrhea for at least 48 hours.      Sincerely,     Lynda Gay \"Sweet\" SUSAN Rebolledo                 "

## 2023-01-05 ENCOUNTER — OFFICE VISIT (OUTPATIENT)
Dept: URGENT CARE | Facility: PHYSICIAN GROUP | Age: 33
End: 2023-01-05
Payer: COMMERCIAL

## 2023-01-05 VITALS
OXYGEN SATURATION: 97 % | RESPIRATION RATE: 16 BRPM | BODY MASS INDEX: 44.68 KG/M2 | TEMPERATURE: 97 F | DIASTOLIC BLOOD PRESSURE: 72 MMHG | HEART RATE: 100 BPM | HEIGHT: 65 IN | SYSTOLIC BLOOD PRESSURE: 122 MMHG | WEIGHT: 268.2 LBS

## 2023-01-05 DIAGNOSIS — J06.9 VIRAL URI WITH COUGH: ICD-10-CM

## 2023-01-05 DIAGNOSIS — R09.81 NASAL CONGESTION: ICD-10-CM

## 2023-01-05 DIAGNOSIS — J02.9 SORE THROAT: ICD-10-CM

## 2023-01-05 LAB
FLUAV+FLUBV AG SPEC QL IA: NEGATIVE
INT CON NEG: NEGATIVE
INT CON NEG: NEGATIVE
INT CON POS: POSITIVE
INT CON POS: POSITIVE
S PYO AG THROAT QL: NEGATIVE

## 2023-01-05 PROCEDURE — 99213 OFFICE O/P EST LOW 20 MIN: CPT | Performed by: NURSE PRACTITIONER

## 2023-01-05 PROCEDURE — 87804 INFLUENZA ASSAY W/OPTIC: CPT | Performed by: NURSE PRACTITIONER

## 2023-01-05 PROCEDURE — 87880 STREP A ASSAY W/OPTIC: CPT | Performed by: NURSE PRACTITIONER

## 2023-01-05 RX ORDER — BENZONATATE 200 MG/1
200 CAPSULE ORAL EVERY 8 HOURS PRN
Qty: 30 CAPSULE | Refills: 0 | Status: SHIPPED | OUTPATIENT
Start: 2023-01-05

## 2023-01-05 RX ORDER — FLUTICASONE PROPIONATE 50 MCG
SPRAY, SUSPENSION (ML) NASAL
Qty: 9.1 ML | Refills: 0 | Status: SHIPPED | OUTPATIENT
Start: 2023-01-05

## 2023-01-05 ASSESSMENT — ENCOUNTER SYMPTOMS
COUGH: 1
SORE THROAT: 1
RHINORRHEA: 1
SPUTUM PRODUCTION: 1
MYALGIAS: 1
SHORTNESS OF BREATH: 0
CHILLS: 1

## 2023-01-05 ASSESSMENT — VISUAL ACUITY: OU: 1

## 2023-01-05 NOTE — PROGRESS NOTES
"Subjective:     Nicole Castro is a 32 y.o. female who presents for Nasal Congestion (Started Tuesday, Last night, symptoms grew worse, \"leaky\", stuffed up, aches, chills, OTC: Nyquill, benedryl, Theraflu, At-Home Negative) and Pharyngitis       URI   This is a new problem. Episode onset: Tuesday. The problem has been gradually worsening. Associated symptoms include congestion (Nasal pain), coughing, rhinorrhea (Yellow) and a sore throat. Pertinent negatives include no ear pain. Treatments tried: Mucinex, Robitussin.     Negative home Covid test.    Review of Systems   Constitutional:  Positive for chills.   HENT:  Positive for congestion (Nasal pain), ear discharge, rhinorrhea (Yellow) and sore throat. Negative for ear pain.    Respiratory:  Positive for cough and sputum production. Negative for shortness of breath.    Musculoskeletal:  Positive for myalgias.   All other systems reviewed and are negative.    Refer to HPI for additional details.    During this visit, appropriate PPE was worn, hand hygiene was performed, and the patient and any visitors were masked.    PMH:  has no past medical history of Allergy, ASTHMA, or Diabetes.    MEDS:   Current Outpatient Medications:     benzonatate (TESSALON) 200 MG capsule, Take 1 Capsule by mouth every 8 hours as needed for Cough., Disp: 30 Capsule, Rfl: 0    fluticasone (FLONASE) 50 MCG/ACT nasal spray, 1 spray in each nostril 2 times a day, Disp: 9.1 mL, Rfl: 0    ALLERGIES: No Known Allergies  SURGHX: History reviewed. No pertinent surgical history.  SOCHX:  reports that she has never smoked. She has never used smokeless tobacco. She reports current alcohol use. She reports current drug use. Drug: Marijuana.    FH: Per HPI as applicable/pertinent.      Objective:     /72   Pulse 100   Temp 36.1 °C (97 °F) (Temporal)   Resp 16   Ht 1.651 m (5' 5\")   Wt 122 kg (268 lb 3.2 oz)   SpO2 97%   BMI 44.63 kg/m²     Physical Exam  Nursing note reviewed. "   Constitutional:       General: She is not in acute distress.     Appearance: She is well-developed. She is not ill-appearing or toxic-appearing.   HENT:      Head: Normocephalic.      Right Ear: Tympanic membrane and external ear normal. No drainage.      Left Ear: Tympanic membrane and external ear normal. No drainage.      Nose: Congestion and rhinorrhea present. Rhinorrhea is clear.      Mouth/Throat:      Mouth: Mucous membranes are moist.      Pharynx: Uvula midline. Pharyngeal swelling and posterior oropharyngeal erythema present. No oropharyngeal exudate.      Tonsils: No tonsillar exudate. 2+ on the right. 2+ on the left.   Eyes:      General: Vision grossly intact.      Extraocular Movements: Extraocular movements intact.      Conjunctiva/sclera: Conjunctivae normal.   Neck:      Trachea: Phonation normal.   Cardiovascular:      Rate and Rhythm: Normal rate and regular rhythm.      Heart sounds: Normal heart sounds.   Pulmonary:      Effort: Pulmonary effort is normal. No respiratory distress.      Breath sounds: Normal breath sounds. No decreased breath sounds, wheezing, rhonchi or rales.   Musculoskeletal:         General: No deformity. Normal range of motion.      Cervical back: Normal range of motion.   Lymphadenopathy:      Cervical: No cervical adenopathy.   Skin:     General: Skin is warm and dry.      Coloration: Skin is not pale.   Neurological:      Mental Status: She is alert and oriented to person, place, and time.      Motor: No weakness.   Psychiatric:         Behavior: Behavior normal. Behavior is cooperative.     Influenza A/B swab: negative    Rapid Strep A swab: negative      Assessment/Plan:     1. Viral URI with cough  - benzonatate (TESSALON) 200 MG capsule; Take 1 Capsule by mouth every 8 hours as needed for Cough.  Dispense: 30 Capsule; Refill: 0    2. Nasal congestion  - fluticasone (FLONASE) 50 MCG/ACT nasal spray; 1 spray in each nostril 2 times a day  Dispense: 9.1 mL; Refill:  0    3. Sore throat  - POCT Rapid Strep A  - POCT Influenza A/B    Discussed likely self-limiting viral etiology and expected course and duration of illness. Vital signs stable, afebrile, no acute distress at this time.     Rx as above sent electronically. OTC nasal saline rinse/spray. OTC ibuprofen PRN.    Differential diagnosis, natural history, supportive care, rest, fluids, over-the-counter symptom management per 's instructions, close monitoring, and indications for immediate follow-up discussed.     Warning signs reviewed. Return precautions discussed.     All questions answered. Patient agrees with the plan of care.    [facility]

## 2024-05-08 ENCOUNTER — OFFICE VISIT (OUTPATIENT)
Dept: URGENT CARE | Facility: PHYSICIAN GROUP | Age: 34
End: 2024-05-08
Payer: COMMERCIAL

## 2024-05-08 VITALS
BODY MASS INDEX: 48.82 KG/M2 | OXYGEN SATURATION: 98 % | WEIGHT: 293 LBS | HEIGHT: 65 IN | SYSTOLIC BLOOD PRESSURE: 126 MMHG | HEART RATE: 77 BPM | RESPIRATION RATE: 18 BRPM | DIASTOLIC BLOOD PRESSURE: 82 MMHG | TEMPERATURE: 98.4 F

## 2024-05-08 DIAGNOSIS — R05.1 ACUTE COUGH: ICD-10-CM

## 2024-05-08 PROCEDURE — 99213 OFFICE O/P EST LOW 20 MIN: CPT | Performed by: FAMILY MEDICINE

## 2024-05-08 PROCEDURE — 3079F DIAST BP 80-89 MM HG: CPT | Performed by: FAMILY MEDICINE

## 2024-05-08 PROCEDURE — 3074F SYST BP LT 130 MM HG: CPT | Performed by: FAMILY MEDICINE

## 2024-05-08 RX ORDER — BENZONATATE 200 MG/1
200 CAPSULE ORAL 3 TIMES DAILY PRN
Qty: 30 CAPSULE | Refills: 0 | Status: SHIPPED | OUTPATIENT
Start: 2024-05-08

## 2024-05-08 RX ORDER — DEXTROMETHORPHAN HYDROBROMIDE AND PROMETHAZINE HYDROCHLORIDE 15; 6.25 MG/5ML; MG/5ML
5 SYRUP ORAL 4 TIMES DAILY PRN
Qty: 120 ML | Refills: 0 | Status: SHIPPED | OUTPATIENT
Start: 2024-05-08

## 2024-05-08 ASSESSMENT — ENCOUNTER SYMPTOMS
EYE DISCHARGE: 0
WEIGHT LOSS: 0
EYE REDNESS: 0
NAUSEA: 0
MYALGIAS: 0
VOMITING: 0

## 2024-05-09 NOTE — PROGRESS NOTES
"Subjective     Nicole Castro is a 34 y.o. female who presents with Cough (5 DAYS )            5 days dry cough, ST with cough. No fever. No nasal congestion. No SOB/wheeze. No known environmental factors. No recent heartburn. No other aggravating or alleviating factors.          Review of Systems   Constitutional:  Negative for malaise/fatigue and weight loss.   Eyes:  Negative for discharge and redness.   Gastrointestinal:  Negative for nausea and vomiting.   Musculoskeletal:  Negative for joint pain and myalgias.   Skin:  Negative for itching and rash.              Objective     /82   Pulse 77   Temp 36.9 °C (98.4 °F) (Temporal)   Resp 18   Ht 1.651 m (5' 5\")   Wt (!) 137 kg (303 lb)   SpO2 98%   BMI 50.42 kg/m²      Physical Exam  Constitutional:       General: She is not in acute distress.     Appearance: She is well-developed.   HENT:      Head: Normocephalic and atraumatic.      Right Ear: Tympanic membrane normal.      Left Ear: Tympanic membrane normal.      Nose: Nose normal.      Mouth/Throat:      Mouth: Mucous membranes are moist.      Pharynx: No posterior oropharyngeal erythema.   Eyes:      Conjunctiva/sclera: Conjunctivae normal.   Cardiovascular:      Rate and Rhythm: Normal rate and regular rhythm.      Heart sounds: Normal heart sounds. No murmur heard.  Pulmonary:      Effort: Pulmonary effort is normal.      Breath sounds: Normal breath sounds. No wheezing.   Musculoskeletal:      Cervical back: Neck supple.   Lymphadenopathy:      Cervical: No cervical adenopathy.   Skin:     General: Skin is warm and dry.      Findings: No rash.   Neurological:      Mental Status: She is alert.                             Assessment & Plan        1. Acute cough  benzonatate (TESSALON) 200 MG capsule    promethazine-dextromethorphan (PROMETHAZINE-DM) 6.25-15 MG/5ML syrup        Differential diagnosis, natural history, supportive care, and indications for immediate follow-up were discussed. "